# Patient Record
Sex: FEMALE | Race: OTHER | URBAN - METROPOLITAN AREA
[De-identification: names, ages, dates, MRNs, and addresses within clinical notes are randomized per-mention and may not be internally consistent; named-entity substitution may affect disease eponyms.]

---

## 2021-03-12 ENCOUNTER — EMERGENCY (EMERGENCY)
Facility: HOSPITAL | Age: 31
LOS: 1 days | Discharge: HOME | End: 2021-03-12
Attending: EMERGENCY MEDICINE | Admitting: EMERGENCY MEDICINE
Payer: OTHER MISCELLANEOUS

## 2021-03-12 VITALS
OXYGEN SATURATION: 96 % | DIASTOLIC BLOOD PRESSURE: 101 MMHG | HEART RATE: 107 BPM | TEMPERATURE: 98 F | RESPIRATION RATE: 18 BRPM | WEIGHT: 169.98 LBS | HEIGHT: 65 IN | SYSTOLIC BLOOD PRESSURE: 149 MMHG

## 2021-03-12 DIAGNOSIS — Z77.21 CONTACT WITH AND (SUSPECTED) EXPOSURE TO POTENTIALLY HAZARDOUS BODY FLUIDS: ICD-10-CM

## 2021-03-12 PROCEDURE — 99283 EMERGENCY DEPT VISIT LOW MDM: CPT
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
## 2021-03-12 NOTE — ED PROVIDER NOTE - PATIENT PORTAL LINK FT
You can access the FollowMyHealth Patient Portal offered by [facility] by registering at the following website: [email or web address]. By joining [employer]’s FollowMyHealth portal, you will also be able to view your health information using other applications (apps) compatible with our system.

## 2021-03-12 NOTE — ED PROVIDER NOTE - NSFOLLOWUPINSTRUCTIONS_ED_ALL_ED_FT
Body Fluid Exposure Information      People come in contact with blood and other body fluids in many ways. Sometimes, body fluids may have germs (bacteria or viruses) that can cause infections. These germs can be spread when an infected person's body fluids come in contact with the mouth, nose, eyes, genitals, or broken skin of another person. Broken skin includes chapped skin, cuts, abrasions, or irritation and swelling of the skin (dermatitis).  You are more likely to be exposed to infected body fluids if you:  •Are a health care worker or family member who is taking care of a sick person.      •Use needles to inject drugs, and you share needles with other users.      •Have sex or engage in other sexual activities without using a condom or other protection.    The risk of an infection spreading through exposure to body fluids is small and depends on several factors. These include:  •The type of body fluid.      •How you were exposed to the body fluid.      •The type of infection.      •The risk factors of the person who is the source of the body fluid. Your health care provider can help you assess the risk.        What types of body fluids can spread infection?  The following body fluids can spread infections:  •Blood.      •Semen.      •Vaginal secretions.      •Urine.      •Feces.      •Saliva.      •Nasal or eye discharge.      •Breast milk.      •Amniotic fluid.      •Fluids surrounding body organs.      •Pus or other fluids coming from a wound.        What are some first-aid measures for body fluid exposure?    The following steps should be taken as soon as possible after you are exposed to body fluids:    Intact skin     •For contact with closed skin, wash the area with soap and water. If soap and water are not available, use hand .      Broken skin   •For contact with broken skin:  •Let the area bleed a little.      •Wash the area well with soap and water. If soap is not available, use just water or hand .      •Place a bandage or clean towel on the wound and apply gentle pressure to stop the bleeding. Do not squeeze or rub the area.        Do not use harsh chemicals such as bleach or iodine.    Eyes     •Rinse your eyes with water or saline solution for 30 seconds or longer.      •If you are wearing contact lenses, leave the contact lenses in while rinsing your eyes. After the rinsing is complete, remove the contact lenses.      Mouth     •Spit out the fluids. Rinse with water 4–5 times, spitting it out each time.        When should I seek help?  After performing first aid:  •Call your health care provider or seek emergency care right away if blood or other body fluids made contact with broken skin or the eyes, nose, mouth, or genitals.      •Tell your work supervisor immediately if the exposure to body fluid happened in the workplace. Follow your company's procedures for dealing with body fluid exposure.        What will happen after I report the exposure?  Your health care provider will ask you several questions, including questions about:  •Your medical history, including vaccine records.      •Date and time of the exposure.      •Whether you saw body fluids during the exposure.      •Type of body fluid you were exposed to.      •Volume of body fluid you were exposed to.      •How the exposure happened.      •Any devices used, such as needles.      •The area of your body that made contact with the body fluid.      •Any injury to your skin or other areas.      •How long contact was made with the body fluid.      •Whether the person whose body fluid you were exposed to has certain risk factors or health conditions, if known.    Your health care provider will assess your risk for infection. Often, no treatment is necessary.
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
## 2021-03-12 NOTE — ED PROVIDER NOTE - TEMPLATE, MLM
"Subjective   Yessica Galvin is a 57 y.o. female.     Influenza   This is a new problem. The current episode started in the past 7 days. The problem occurs constantly. The problem has been gradually worsening. Associated symptoms include arthralgias, chills, congestion, coughing, fatigue, a fever, headaches, myalgias and nausea. Pertinent negatives include no chest pain. The symptoms are aggravated by exertion. She has tried NSAIDs and rest for the symptoms. The treatment provided no relief.        The following portions of the patient's history were reviewed and updated as appropriate: allergies, current medications, past social history and problem list.    Review of Systems   Constitutional: Positive for chills, fatigue and fever.   HENT: Positive for congestion.    Respiratory: Positive for cough and wheezing. Negative for chest tightness and shortness of breath.    Cardiovascular: Negative for chest pain.   Gastrointestinal: Positive for nausea.   Musculoskeletal: Positive for arthralgias and myalgias.   Neurological: Positive for headaches.       Objective   Visit Vitals   • /80   • Pulse 75   • Temp 98.4 °F (36.9 °C)   • Resp 18   • Ht 65\" (165.1 cm)   • Wt 178 lb 9.6 oz (81 kg)   • SpO2 97%   • BMI 29.72 kg/m2     Physical Exam   Constitutional: She is oriented to person, place, and time. She appears well-developed and well-nourished. She is cooperative.   HENT:   Head: Normocephalic.   Right Ear: External ear normal.   Left Ear: External ear normal.   Nose: Nose normal.   Mouth/Throat: Oropharynx is clear and moist.   Throat is red there is a cloudy postnasal drip no exudate   Eyes: Conjunctivae are normal. Pupils are equal, round, and reactive to light. No scleral icterus.   Neck: Neck supple. Carotid bruit is not present. No thyromegaly present.   Cardiovascular: Normal rate and regular rhythm.    Pulmonary/Chest: Effort normal.   Upper airway rhonchi and a few scattered expiratory wheezes, rapid flu " testing positive   Abdominal: There is no hepatosplenomegaly.   Musculoskeletal: Normal range of motion.   Neurological: She is alert and oriented to person, place, and time.   No focal deficits no lateralizing signs   Skin: Skin is warm and dry. No rash noted.   Psychiatric: She has a normal mood and affect. Cognition and memory are normal.   Nursing note and vitals reviewed.      Assessment/Plan   Problem List Items Addressed This Visit     None      Visit Diagnoses     Flu-like symptoms    -  Primary    Relevant Orders    POCT Influenza A/B (Completed)    Flu        Relevant Medications    oseltamivir (TAMIFLU) 75 MG capsule          New Medications Ordered This Visit   Medications   • oseltamivir (TAMIFLU) 75 MG capsule     Sig: Take 1 capsule by mouth 2 (Two) Times a Day.     Dispense:  10 capsule     Refill:  0   • HYDROcodone-homatropine (HYCODAN) 5-1.5 MG/5ML syrup     Sig: Take 5 mL by mouth Every 6 (Six) Hours As Needed for Cough.     Dispense:  120 mL     Refill:  ml     Increase fluids lots of rest alternate Tylenol and ibuprofen every 6 hours okay note for work to return on Monday          General
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
